# Patient Record
Sex: MALE | URBAN - METROPOLITAN AREA
[De-identification: names, ages, dates, MRNs, and addresses within clinical notes are randomized per-mention and may not be internally consistent; named-entity substitution may affect disease eponyms.]

---

## 2024-06-27 ENCOUNTER — APPOINTMENT (OUTPATIENT)
Dept: UROLOGY | Facility: CLINIC | Age: 49
End: 2024-06-27

## 2024-06-27 VITALS — WEIGHT: 188 LBS | RESPIRATION RATE: 16 BRPM

## 2024-06-27 DIAGNOSIS — Z12.5 SCREENING FOR PROSTATE CANCER: ICD-10-CM

## 2024-06-27 DIAGNOSIS — R35.1 NOCTURIA: ICD-10-CM

## 2024-06-27 DIAGNOSIS — R53.83 FATIGUE, UNSPECIFIED TYPE: ICD-10-CM

## 2024-06-27 LAB
POC APPEARANCE, URINE: CLEAR
POC BILIRUBIN, URINE: NEGATIVE
POC BLOOD, URINE: NEGATIVE
POC COLOR, URINE: YELLOW
POC GLUCOSE, URINE: NEGATIVE MG/DL
POC KETONES, URINE: NEGATIVE MG/DL
POC LEUKOCYTES, URINE: NEGATIVE
POC NITRITE,URINE: NEGATIVE
POC PH, URINE: 6.5 PH
POC PROTEIN, URINE: NEGATIVE MG/DL
POC SPECIFIC GRAVITY, URINE: 1.02
POC UROBILINOGEN, URINE: 0.2 EU/DL

## 2024-06-27 PROCEDURE — 4004F PT TOBACCO SCREEN RCVD TLK: CPT | Performed by: UROLOGY

## 2024-06-27 PROCEDURE — 81003 URINALYSIS AUTO W/O SCOPE: CPT | Performed by: UROLOGY

## 2024-06-27 PROCEDURE — 99203 OFFICE O/P NEW LOW 30 MIN: CPT | Performed by: UROLOGY

## 2024-06-27 ASSESSMENT — ENCOUNTER SYMPTOMS
ENDOCRINE NEGATIVE: 1
DIFFICULTY URINATING: 0
COUGH: 0
CHILLS: 0
EYES NEGATIVE: 1
SHORTNESS OF BREATH: 0
ALLERGIC/IMMUNOLOGIC NEGATIVE: 1
PSYCHIATRIC NEGATIVE: 1
NAUSEA: 0
FEVER: 0

## 2024-06-27 NOTE — PROGRESS NOTES
Subjective   Patient ID: Angelo Guthrie is a 48 y.o. male.    HPI  Patient is here to establish care. Most recent PSA was 0.55 in 11/22. No fhx of prostate cancer. Chronic BPH sx are mild and stable. Denies urgency and frequency. Denies dysuria. Denies hematuria. Nocturia x1. Alcohol worsens sx. No hx of kidney stones.       Review of Systems   Constitutional:  Negative for chills and fever.   HENT: Negative.     Eyes: Negative.    Respiratory:  Negative for cough and shortness of breath.    Cardiovascular:  Negative for chest pain and leg swelling.   Gastrointestinal:  Negative for nausea.   Endocrine: Negative.    Genitourinary:  Negative for difficulty urinating.        Negative except for documented in HPI   Allergic/Immunologic: Negative.    Neurological:         Alert & oriented X 3   Hematological:         Denies blood thinners   Psychiatric/Behavioral: Negative.         Objective   Physical Exam  Vitals and nursing note reviewed.   Constitutional:       General: He is not in acute distress.     Appearance: Normal appearance.   Pulmonary:      Effort: Pulmonary effort is normal.   Abdominal:      Tenderness: There is no abdominal tenderness.   Genitourinary:     Comments: Kidneys non palpable bilaterally  Bladder non palpable or tender  Scrotum no mass, No hydrocele  Epididymis- No spermatocele. Non Tender.  Testicles: No mass. WNL  Urethra: No discharge  Penis within normal limits... No lesions. CIrcumcised  Prostate - symmetric, no nodules. BENIGN  Seminal Vesicals: No mass.  Sphincter tone: normal  Neurological:      Mental Status: He is alert.         Assessment/Plan   Diagnoses and all orders for this visit:  Screening for prostate cancer  Nocturia  -     Prostate Specific Antigen; Future    All available PSA values reviewed, Options discussed. Questions answered.   Diet changes for prostate health discussed and educational information given. Pros/Cons of prostate health supplements discussed.    Treatment options for LUTS reviewed-not an issue  Discussed timed voiding. Discussed fluid and caffeine intake  Treatment options for ED reviewed.  Lifestyle change to help prevent UTIs discussed. Encouraged fluid intake.  UA ordered  pros/cons of Testosterone replacement reviewed. Replacement options discussed. Questions answered. Available levels reviewed.      F/U  1 year with PSA and T Level